# Patient Record
Sex: MALE | Race: WHITE | NOT HISPANIC OR LATINO | Employment: STUDENT | URBAN - METROPOLITAN AREA
[De-identification: names, ages, dates, MRNs, and addresses within clinical notes are randomized per-mention and may not be internally consistent; named-entity substitution may affect disease eponyms.]

---

## 2023-06-26 ENCOUNTER — LAB (OUTPATIENT)
Dept: LAB | Facility: LAB | Age: 23
End: 2023-06-26
Payer: COMMERCIAL

## 2023-06-26 ENCOUNTER — OFFICE VISIT (OUTPATIENT)
Dept: PRIMARY CARE | Facility: CLINIC | Age: 23
End: 2023-06-26
Payer: COMMERCIAL

## 2023-06-26 VITALS
HEART RATE: 97 BPM | TEMPERATURE: 98.1 F | DIASTOLIC BLOOD PRESSURE: 83 MMHG | BODY MASS INDEX: 26.77 KG/M2 | SYSTOLIC BLOOD PRESSURE: 105 MMHG | WEIGHT: 191.2 LBS | HEIGHT: 71 IN

## 2023-06-26 DIAGNOSIS — Z00.00 HEALTH CARE MAINTENANCE: Primary | ICD-10-CM

## 2023-06-26 DIAGNOSIS — Z00.00 HEALTH CARE MAINTENANCE: ICD-10-CM

## 2023-06-26 PROBLEM — J30.1 ALLERGIC RHINITIS DUE TO POLLEN: Status: ACTIVE | Noted: 2022-03-20

## 2023-06-26 PROBLEM — J38.3 VOCAL CORD DYSFUNCTION: Status: RESOLVED | Noted: 2023-06-26 | Resolved: 2023-06-26

## 2023-06-26 PROBLEM — J45.909 ASTHMA (HHS-HCC): Status: ACTIVE | Noted: 2023-06-26

## 2023-06-26 PROBLEM — T78.05XA ANAPHYLAXIS DUE TO TREE NUT: Status: ACTIVE | Noted: 2022-03-20

## 2023-06-26 PROBLEM — B00.9 HSV-1 (HERPES SIMPLEX VIRUS 1) INFECTION: Status: ACTIVE | Noted: 2023-06-26

## 2023-06-26 LAB
ALANINE AMINOTRANSFERASE (SGPT) (U/L) IN SER/PLAS: 20 U/L (ref 10–52)
ALBUMIN (G/DL) IN SER/PLAS: 5 G/DL (ref 3.4–5)
ALKALINE PHOSPHATASE (U/L) IN SER/PLAS: 47 U/L (ref 33–120)
ANION GAP IN SER/PLAS: 11 MMOL/L (ref 10–20)
ASPARTATE AMINOTRANSFERASE (SGOT) (U/L) IN SER/PLAS: 24 U/L (ref 9–39)
BILIRUBIN TOTAL (MG/DL) IN SER/PLAS: 0.6 MG/DL (ref 0–1.2)
CALCIUM (MG/DL) IN SER/PLAS: 9.7 MG/DL (ref 8.6–10.3)
CARBON DIOXIDE, TOTAL (MMOL/L) IN SER/PLAS: 26 MMOL/L (ref 21–32)
CHLORIDE (MMOL/L) IN SER/PLAS: 106 MMOL/L (ref 98–107)
CREATININE (MG/DL) IN SER/PLAS: 1.14 MG/DL (ref 0.5–1.3)
ERYTHROCYTE DISTRIBUTION WIDTH (RATIO) BY AUTOMATED COUNT: 11.8 % (ref 11.5–14.5)
ERYTHROCYTE MEAN CORPUSCULAR HEMOGLOBIN CONCENTRATION (G/DL) BY AUTOMATED: 34.2 G/DL (ref 32–36)
ERYTHROCYTE MEAN CORPUSCULAR VOLUME (FL) BY AUTOMATED COUNT: 88 FL (ref 80–100)
ERYTHROCYTES (10*6/UL) IN BLOOD BY AUTOMATED COUNT: 4.8 X10E12/L (ref 4.5–5.9)
GFR MALE: >90 ML/MIN/1.73M2
GLUCOSE (MG/DL) IN SER/PLAS: 86 MG/DL (ref 74–99)
HEMATOCRIT (%) IN BLOOD BY AUTOMATED COUNT: 42.4 % (ref 41–52)
HEMOGLOBIN (G/DL) IN BLOOD: 14.5 G/DL (ref 13.5–17.5)
LEUKOCYTES (10*3/UL) IN BLOOD BY AUTOMATED COUNT: 9 X10E9/L (ref 4.4–11.3)
PLATELETS (10*3/UL) IN BLOOD AUTOMATED COUNT: 296 X10E9/L (ref 150–450)
POTASSIUM (MMOL/L) IN SER/PLAS: 4.2 MMOL/L (ref 3.5–5.3)
PROTEIN TOTAL: 7.4 G/DL (ref 6.4–8.2)
SODIUM (MMOL/L) IN SER/PLAS: 139 MMOL/L (ref 136–145)
UREA NITROGEN (MG/DL) IN SER/PLAS: 15 MG/DL (ref 6–23)

## 2023-06-26 PROCEDURE — 1036F TOBACCO NON-USER: CPT | Performed by: NURSE PRACTITIONER

## 2023-06-26 PROCEDURE — 99395 PREV VISIT EST AGE 18-39: CPT | Performed by: NURSE PRACTITIONER

## 2023-06-26 PROCEDURE — 36415 COLL VENOUS BLD VENIPUNCTURE: CPT

## 2023-06-26 PROCEDURE — 80053 COMPREHEN METABOLIC PANEL: CPT

## 2023-06-26 PROCEDURE — 85027 COMPLETE CBC AUTOMATED: CPT

## 2023-06-26 RX ORDER — EPINEPHRINE 0.15 MG/.3ML
0.15 INJECTION INTRAMUSCULAR
COMMUNITY
End: 2023-06-26 | Stop reason: ALTCHOICE

## 2023-06-26 RX ORDER — LEVALBUTEROL TARTRATE 45 UG/1
AEROSOL, METERED ORAL
COMMUNITY
Start: 2020-11-02

## 2023-06-26 RX ORDER — BUDESONIDE AND FORMOTEROL FUMARATE DIHYDRATE 80; 4.5 UG/1; UG/1
2 AEROSOL RESPIRATORY (INHALATION) AS NEEDED
COMMUNITY

## 2023-06-26 RX ORDER — VALACYCLOVIR HYDROCHLORIDE 1 G/1
1000 TABLET, FILM COATED ORAL 2 TIMES DAILY
Qty: 14 TABLET | Refills: 0 | COMMUNITY
Start: 2023-04-13 | End: 2023-04-20

## 2023-06-26 RX ORDER — FLUTICASONE PROPIONATE 50 MCG
SPRAY, SUSPENSION (ML) NASAL AS NEEDED
COMMUNITY
Start: 2022-06-29

## 2023-06-26 RX ORDER — ALBUTEROL SULFATE 90 UG/1
AEROSOL, METERED RESPIRATORY (INHALATION) AS NEEDED
COMMUNITY
Start: 2022-06-29

## 2023-06-26 RX ORDER — CETIRIZINE HYDROCHLORIDE 5 MG/5ML
SOLUTION ORAL AS NEEDED
COMMUNITY
End: 2023-06-26 | Stop reason: ALTCHOICE

## 2023-06-26 RX ORDER — VALACYCLOVIR HYDROCHLORIDE 500 MG/1
1 TABLET, FILM COATED ORAL DAILY
COMMUNITY
Start: 2023-04-13

## 2023-06-26 RX ORDER — MINERAL OIL
1 ENEMA (ML) RECTAL DAILY
COMMUNITY
Start: 2022-06-29

## 2023-06-26 RX ORDER — EPINEPHRINE 0.3 MG/.3ML
INJECTION SUBCUTANEOUS
COMMUNITY

## 2023-06-26 ASSESSMENT — ENCOUNTER SYMPTOMS
SORE THROAT: 0
CHEST TIGHTNESS: 0
EYE DISCHARGE: 0
ADENOPATHY: 0
DYSPHORIC MOOD: 0
EYE ITCHING: 0
SPEECH DIFFICULTY: 0
HEMATURIA: 0
BACK PAIN: 0
MYALGIAS: 0
UNEXPECTED WEIGHT CHANGE: 0
WEAKNESS: 0
PHOTOPHOBIA: 0
NAUSEA: 0
SLEEP DISTURBANCE: 0
COUGH: 0
DIFFICULTY URINATING: 0
PALPITATIONS: 1
ARTHRALGIAS: 0
FEVER: 0
SHORTNESS OF BREATH: 0
POLYDIPSIA: 0
EYE REDNESS: 0
POLYPHAGIA: 0
FATIGUE: 0
DIARRHEA: 1
NUMBNESS: 0
BLOOD IN STOOL: 0
BRUISES/BLEEDS EASILY: 0
RHINORRHEA: 0
DYSURIA: 0
HEADACHES: 0
NECK PAIN: 0
FREQUENCY: 0
VOMITING: 0
SINUS PRESSURE: 0
EYE PAIN: 0
ABDOMINAL PAIN: 0
CONSTIPATION: 0
CHILLS: 0
WHEEZING: 0
NERVOUS/ANXIOUS: 0
DIZZINESS: 0

## 2023-06-26 NOTE — PROGRESS NOTES
Subjective   Patient ID: Abhijit Woodard is a 22 y.o. male who presents for Annual Exam.    HPI  Last well exam: 2.5 years ago  Overall health has been good.  There have been no changes in the patients PMH, PSH, FH or social history. Reviewed today.  Diet: Overall healthy  Exercise: Weight lift and cardio 5 days a week  Dental: Regular dental exams. Brushes 2 times daily. Flosses 2 times/wk  Vision: Last Eye exam: no recent eye exam    Uncorrected Vision  Tobacco Use: No tobacco  Alcohol Use: 6-10 drinks a week (mainly on the weekends at school).  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  Sexually active: Uses condoms most of the time. 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   Has had lesions on penis that are red and not painful- last 4 days- has not been able to determine if HSV.   Has never had viral culture done.   None present today.    Review of Systems   Constitutional:  Negative for chills, fatigue, fever and unexpected weight change.   HENT:  Positive for congestion. Negative for ear pain, hearing loss, nosebleeds, postnasal drip, rhinorrhea, sinus pressure, sore throat and tinnitus.    Eyes:  Negative for photophobia, pain, discharge, redness, itching and visual disturbance.   Respiratory:  Negative for cough, chest tightness, shortness of breath and wheezing.    Cardiovascular:  Positive for palpitations. Negative for chest pain and leg swelling.   Gastrointestinal:  Positive for diarrhea. Negative for abdominal pain, blood in stool, constipation, nausea and vomiting.   Endocrine: Negative for cold intolerance, heat intolerance, polydipsia, polyphagia and polyuria.   Genitourinary:  Negative for difficulty urinating, dysuria, frequency, hematuria and urgency.   Musculoskeletal:  Negative for arthralgias, back pain, myalgias and  "neck pain.   Skin:  Negative for rash.   Neurological:  Negative for dizziness, syncope, speech difficulty, weakness, numbness and headaches.   Hematological:  Negative for adenopathy. Does not bruise/bleed easily.   Psychiatric/Behavioral:  Negative for dysphoric mood and sleep disturbance. The patient is not nervous/anxious.        Objective   /83   Pulse 97   Temp 36.7 °C (98.1 °F)   Ht 1.803 m (5' 11\")   Wt 86.7 kg (191 lb 3.2 oz)   BMI 26.67 kg/m²     Physical Exam  Constitutional:       General: He is not in acute distress.     Appearance: Normal appearance. He is not toxic-appearing.   HENT:      Head: Normocephalic and atraumatic.      Right Ear: Tympanic membrane and ear canal normal.      Left Ear: Tympanic membrane and ear canal normal.      Nose: Nose normal.      Mouth/Throat:      Mouth: Mucous membranes are moist.      Pharynx: Oropharynx is clear.   Eyes:      Extraocular Movements: Extraocular movements intact.      Conjunctiva/sclera: Conjunctivae normal.      Pupils: Pupils are equal, round, and reactive to light.   Neck:      Thyroid: No thyroid mass or thyromegaly.      Vascular: No carotid bruit.   Cardiovascular:      Rate and Rhythm: Normal rate and regular rhythm.      Pulses: Normal pulses.      Heart sounds: Normal heart sounds, S1 normal and S2 normal. No murmur heard.  Pulmonary:      Effort: Pulmonary effort is normal. No respiratory distress.      Breath sounds: Normal breath sounds.   Abdominal:      General: Abdomen is flat. Bowel sounds are normal.      Palpations: Abdomen is soft.      Tenderness: There is no abdominal tenderness.   Musculoskeletal:         General: Normal range of motion.      Cervical back: Normal range of motion and neck supple.      Right lower leg: No edema.      Left lower leg: No edema.   Lymphadenopathy:      Cervical: No cervical adenopathy.   Skin:     General: Skin is warm and dry.   Neurological:      Mental Status: He is alert and oriented " to person, place, and time.      Cranial Nerves: No cranial nerve deficit.      Sensory: No sensory deficit.      Motor: No weakness.      Coordination: Coordination normal.      Gait: Gait normal.      Deep Tendon Reflexes: Reflexes are normal and symmetric. Reflexes normal.   Psychiatric:         Attention and Perception: Attention normal.         Mood and Affect: Mood and affect normal.         Speech: Speech normal.         Behavior: Behavior normal.         Thought Content: Thought content normal.         Judgment: Judgment normal.         Assessment/Plan   Problem List Items Addressed This Visit          Health Encounters    Health care maintenance - Primary     UTD on recommended screening and vaccines.  Check labs today.  Advised to follow up when has genital lesion for viral culture.         Relevant Orders    Comprehensive Metabolic Panel (Completed)    CBC (Completed)            It has been a pleasure seeing you today!

## 2023-06-27 PROBLEM — Z00.00 HEALTH CARE MAINTENANCE: Status: ACTIVE | Noted: 2023-06-27

## 2023-06-28 NOTE — ASSESSMENT & PLAN NOTE
UTD on recommended screening and vaccines.  Check labs today.  Advised to follow up when has genital lesion for viral culture.

## 2023-08-04 ENCOUNTER — APPOINTMENT (OUTPATIENT)
Dept: PRIMARY CARE | Facility: CLINIC | Age: 23
End: 2023-08-04
Payer: COMMERCIAL

## 2023-08-04 ENCOUNTER — OFFICE VISIT (OUTPATIENT)
Dept: PRIMARY CARE | Facility: CLINIC | Age: 23
End: 2023-08-04
Payer: COMMERCIAL

## 2023-08-04 VITALS
WEIGHT: 185.2 LBS | BODY MASS INDEX: 25.83 KG/M2 | TEMPERATURE: 97.2 F | DIASTOLIC BLOOD PRESSURE: 86 MMHG | SYSTOLIC BLOOD PRESSURE: 120 MMHG

## 2023-08-04 DIAGNOSIS — N48.9 LESION OF PENIS: Primary | ICD-10-CM

## 2023-08-04 PROBLEM — D80.9 IMMUNODEFICIENCY WITH PREDOMINANTLY ANTIBODY DEFECTS, UNSPECIFIED (MULTI): Status: RESOLVED | Noted: 2023-08-04 | Resolved: 2023-08-04

## 2023-08-04 PROCEDURE — 87529 HSV DNA AMP PROBE: CPT

## 2023-08-04 PROCEDURE — 99213 OFFICE O/P EST LOW 20 MIN: CPT | Performed by: NURSE PRACTITIONER

## 2023-08-04 PROCEDURE — 1036F TOBACCO NON-USER: CPT | Performed by: NURSE PRACTITIONER

## 2023-08-04 ASSESSMENT — ENCOUNTER SYMPTOMS
CHILLS: 0
FEVER: 0
ROS SKIN COMMENTS: AS NOTED IN THE HPI.
FATIGUE: 0

## 2023-08-04 ASSESSMENT — PATIENT HEALTH QUESTIONNAIRE - PHQ9
SUM OF ALL RESPONSES TO PHQ9 QUESTIONS 1 AND 2: 1
2. FEELING DOWN, DEPRESSED OR HOPELESS: NOT AT ALL
1. LITTLE INTEREST OR PLEASURE IN DOING THINGS: SEVERAL DAYS

## 2023-08-04 NOTE — PROGRESS NOTES
Subjective   Patient ID: Abhijit Woodard is a 22 y.o. male who presents for Establish Care (Per pt would like to speak with Yoly ).    HPI  He presents to the office today for evaluation of lesions on his penis which he noticed yesterday.  The areas are scattered. They are red and very minimally raised.  No pain to the lesions.  No itching.  No drainage.  Has similar lesion in spring 2023.  Was diagnosed with HSV at that time but they were not able to obtain a culture.  No fever or chills.   Feeling ok in general. Felt slightly achy earlier in the week.  No new sexual partners. Has been with the same partner for 5 months.     Review of Systems   Constitutional:  Negative for chills, fatigue and fever.   Skin:         As noted in the HPI.     Objective   /86 (BP Location: Left arm, Patient Position: Sitting, BP Cuff Size: Adult)   Temp 36.2 °C (97.2 °F) (Temporal)   Wt 84 kg (185 lb 3.2 oz)   BMI 25.83 kg/m²     Physical Exam  Constitutional:       General: He is not in acute distress.     Appearance: Normal appearance. He is not toxic-appearing.   Cardiovascular:      Rate and Rhythm: Normal rate and regular rhythm.      Heart sounds: Normal heart sounds, S1 normal and S2 normal.   Pulmonary:      Effort: Pulmonary effort is normal.      Breath sounds: Normal breath sounds and air entry.   Genitourinary:     Penis: Lesions present.       Comments: (+) small slightly raised erythematous areas noted to the penis   No visible vesicles noted today.  The lesions were swabbed today.  Lymphadenopathy:      Cervical: No cervical adenopathy.      Lower Body: No right inguinal adenopathy. No left inguinal adenopathy.   Neurological:      Mental Status: He is alert and oriented to person, place, and time.   Psychiatric:         Mood and Affect: Mood normal.         Behavior: Behavior normal.         Thought Content: Thought content normal.         Judgment: Judgment normal.     Assessment/Plan   Problem List  Items Addressed This Visit       Lesion of penis - Primary     Suspect herpetic but no vesicles noted yet- viral culture obtained today.         Relevant Orders    HSV PCR            It has been a pleasure seeing you today!

## 2023-08-05 LAB
HSV 1 PCR QUAL, SKIN/MUCOSA: NOT DETECTED
HSV 2 PCR QUAL, SKIN/MUCOSA: NOT DETECTED

## 2024-05-31 ENCOUNTER — OFFICE VISIT (OUTPATIENT)
Dept: PRIMARY CARE | Facility: CLINIC | Age: 24
End: 2024-05-31
Payer: COMMERCIAL

## 2024-05-31 VITALS
TEMPERATURE: 97.5 F | BODY MASS INDEX: 26.95 KG/M2 | HEART RATE: 78 BPM | SYSTOLIC BLOOD PRESSURE: 123 MMHG | WEIGHT: 193.2 LBS | OXYGEN SATURATION: 95 % | DIASTOLIC BLOOD PRESSURE: 83 MMHG

## 2024-05-31 DIAGNOSIS — M54.6 ACUTE MIDLINE THORACIC BACK PAIN: Primary | ICD-10-CM

## 2024-05-31 DIAGNOSIS — M62.838 MUSCLE SPASM: ICD-10-CM

## 2024-05-31 PROCEDURE — 99213 OFFICE O/P EST LOW 20 MIN: CPT | Performed by: NURSE PRACTITIONER

## 2024-05-31 RX ORDER — NAPROXEN 500 MG/1
TABLET ORAL
COMMUNITY
Start: 2024-05-21

## 2024-05-31 ASSESSMENT — ENCOUNTER SYMPTOMS
NUMBNESS: 0
CHILLS: 0
BACK PAIN: 1
WEAKNESS: 0
FATIGUE: 0
OCCASIONAL FEELINGS OF UNSTEADINESS: 0
COUGH: 0
DEPRESSION: 0
SHORTNESS OF BREATH: 0
LOSS OF SENSATION IN FEET: 0
PALPITATIONS: 0
FEVER: 0

## 2024-05-31 ASSESSMENT — PATIENT HEALTH QUESTIONNAIRE - PHQ9
SUM OF ALL RESPONSES TO PHQ9 QUESTIONS 1 & 2: 0
2. FEELING DOWN, DEPRESSED OR HOPELESS: NOT AT ALL
1. LITTLE INTEREST OR PLEASURE IN DOING THINGS: NOT AT ALL

## 2024-05-31 NOTE — PATIENT INSTRUCTIONS
Continue to take NSAID with food and muscle relaxer as needed.  Follow up if pain does not continue to improve or should become worse.

## 2024-05-31 NOTE — PROGRESS NOTES
Subjective    Abhijit Woodard is a 23 y.o. male who presents for Hospital Follow-up (Hospital discharge for back pain/spasms/).    HPI  He presents to the office today for an ER follow up. On May 17th he had an annoying pain in his back between the shoulder blades.   (+) pulsatile and would go through his chest.   Would get really intense and then settle down but not go away.  Lasted 6 hours and then went away on its own.    Then on 5/21/2024 he was watching [email or web address] game at friend house.   Then was playing darts and the pain came on.  Reports he had a hard time driving home the pain was so severe.  Would try to stretch but then became stiff and locked up.   Could not get up the steps when he got home.   Laid on his stomach sprawled out.   Was screaming in pain -mom took hm to the ER for evaluation.   In the ER had x-rays (back and chest) along with blood work and was determined to be a muscle spasm- given muscle relaxer and Naproxen.  Mild pain at times but never as severe again.  He is mainly feeling stiff.     Unable to view x-ray from ER visit today. Patient and mom state testing was normal.    The pain was never associated with eating.   No abdominal pain, nausea or vomiting.   No SOB but hard to take a deep breath due to pain.  No palpitations.  No fever or chills.  No recent cough.  The week before the pain started he was moving home from college and lifting things  but nothing real heavy.    Follow with Dr. Oconnor for his asthma.    Review of Systems   Constitutional:  Negative for chills, fatigue and fever.   Respiratory:  Negative for cough and shortness of breath.    Cardiovascular:  Negative for chest pain, palpitations and leg swelling.   Musculoskeletal:  Positive for back pain.   Neurological:  Negative for weakness and numbness.       Objective   /83 (BP Location: Left arm, Patient Position: Sitting, BP Cuff Size: Adult)   Pulse 78   Temp 36.4 °C (97.5 °F) (Temporal)   Wt 87.6 kg (193 lb  3.2 oz)   SpO2 95%   BMI 26.95 kg/m²     Physical Exam  Constitutional:       General: He is not in acute distress.     Appearance: Normal appearance. He is not toxic-appearing.   Eyes:      Extraocular Movements: Extraocular movements intact.      Conjunctiva/sclera: Conjunctivae normal.      Pupils: Pupils are equal, round, and reactive to light.   Cardiovascular:      Rate and Rhythm: Normal rate and regular rhythm.      Pulses: Normal pulses.      Heart sounds: Normal heart sounds, S1 normal and S2 normal. No murmur heard.  Pulmonary:      Effort: Pulmonary effort is normal. No respiratory distress.      Breath sounds: Normal breath sounds and air entry.   Abdominal:      General: Bowel sounds are normal.      Palpations: Abdomen is soft.      Tenderness: There is no abdominal tenderness.   Musculoskeletal:      Thoracic back: Normal. No spasms or bony tenderness. Normal range of motion.      Right lower leg: No edema.      Left lower leg: No edema.      Comments: (+) slight tenderness noted near scapula (R>L)  Strength 5/5 throughout   Lymphadenopathy:      Cervical: No cervical adenopathy.   Neurological:      Mental Status: He is alert and oriented to person, place, and time.      Sensory: Sensation is intact.      Motor: Motor function is intact.   Psychiatric:         Attention and Perception: Attention normal.         Mood and Affect: Mood and affect normal.         Behavior: Behavior normal. Behavior is cooperative.         Thought Content: Thought content normal.         Cognition and Memory: Cognition normal.         Judgment: Judgment normal.         Assessment/Plan   Problem List Items Addressed This Visit    None  Visit Diagnoses       Acute midline thoracic back pain    -  Primary    Relevant Orders    MR thoracic spine wo IV contrast    Muscle spasm            Since symptoms are improving and he mainly has stiffness in thoracic back, discussed giving the back a little more time/couple more weeks  to see if symptoms resolve. His mom (on the phone during the visit) is concerned given how severe his pain (like nothing she has ever seen with him)  the night she took him to the ER and requested an MRI be obtained rather than waiting.    It has been a pleasure seeing you today!

## 2024-06-19 ENCOUNTER — TELEPHONE (OUTPATIENT)
Dept: PRIMARY CARE | Facility: CLINIC | Age: 24
End: 2024-06-19
Payer: COMMERCIAL

## 2024-06-19 NOTE — TELEPHONE ENCOUNTER
----- Message from ANANDA Alves sent at 2024  3:40 PM EDT -----  Regarding: FW: DENIAL OF CPT 93414  Please let him know his insurance denied his MRI of the thoracic spine as they require 6 weeks of treatment without improvement of symptoms for the test to be completed.  Is he still having pain?  ----- Message -----  From: Liana Stahl  Sent: 2024   1:09 PM EDT  To: ANANDA Alves  Subject: DENIAL OF CPT 15535                              Dear Dr. Mcgregor ,     Abhijit Woodard  2000 MRN [id number] :  has an insurance plan that requires pre-certification and this service has been denied.     Please note this patient is scheduled for MR THORACIC SPINE WO IV CONTRAST    I have provided the patients symptoms and clinical rationale, however, they required that you call to further provide evidence of the medical necessity.     Please contact a peer reviewer at [phone] Please provide the patients Case#  [id number]  when making this call.     Your doctor told us that you have pain in the middle of your back. Your doctor ordered an MRI of the middle of your back. An MRI is a way to take pictures of the inside of your body. This test should be used when the pain has not improved after six weeks of treatment by your doctor. Treatment should include medications and other forms of therapy. These need to include home exercises or physical therapy. We reviewed the notes we have. The notes do not show that you had at least six weeks of such treatment. Based on the information we have, this test is not medically necessary. We used [facility] Medical Benefits Management Clinical Guideline titled Imaging of the Spine to make this decision. You may view this guideline at [email or web address]-[email or web address].    Please reply  to this in basket message  with the outcome of your review.     Thank you for assisting with the care of this patients account management

## 2024-06-19 NOTE — TELEPHONE ENCOUNTER
Called pt to inform of denial. He refused to do PT and will call insurance. He stated he has a little pain but not as bad.

## 2024-06-21 ENCOUNTER — APPOINTMENT (OUTPATIENT)
Dept: RADIOLOGY | Facility: CLINIC | Age: 24
End: 2024-06-21
Payer: COMMERCIAL

## 2024-07-17 ENCOUNTER — TELEPHONE (OUTPATIENT)
Dept: PRIMARY CARE | Facility: CLINIC | Age: 24
End: 2024-07-17
Payer: COMMERCIAL

## 2024-07-17 NOTE — TELEPHONE ENCOUNTER
Pt was ok to be double booked for Friday 7/19 at 1130am. - they were also offered to schedule with Sandra bond tomorrow. They chose to wait to see BB on friday

## 2024-07-17 NOTE — TELEPHONE ENCOUNTER
Spoke with mom. Pt has asthma and has a bad cough. She wants him to be seen ASAP. I do not see any available appointments soon. The mom is very upset. Please advise

## 2024-07-19 ENCOUNTER — OFFICE VISIT (OUTPATIENT)
Dept: PRIMARY CARE | Facility: CLINIC | Age: 24
End: 2024-07-19
Payer: COMMERCIAL

## 2024-07-19 VITALS
HEART RATE: 94 BPM | TEMPERATURE: 98.3 F | OXYGEN SATURATION: 97 % | DIASTOLIC BLOOD PRESSURE: 82 MMHG | SYSTOLIC BLOOD PRESSURE: 100 MMHG | HEIGHT: 70 IN | BODY MASS INDEX: 26.88 KG/M2 | WEIGHT: 187.8 LBS

## 2024-07-19 DIAGNOSIS — J01.90 ACUTE SINUSITIS, RECURRENCE NOT SPECIFIED, UNSPECIFIED LOCATION: Primary | ICD-10-CM

## 2024-07-19 PROCEDURE — 1036F TOBACCO NON-USER: CPT | Performed by: NURSE PRACTITIONER

## 2024-07-19 PROCEDURE — 99213 OFFICE O/P EST LOW 20 MIN: CPT | Performed by: NURSE PRACTITIONER

## 2024-07-19 PROCEDURE — 3008F BODY MASS INDEX DOCD: CPT | Performed by: NURSE PRACTITIONER

## 2024-07-19 RX ORDER — AMOXICILLIN AND CLAVULANATE POTASSIUM 875; 125 MG/1; MG/1
875 TABLET, FILM COATED ORAL 2 TIMES DAILY
Qty: 14 TABLET | Refills: 0 | Status: SHIPPED | OUTPATIENT
Start: 2024-07-19

## 2024-07-19 RX ORDER — GLUCOSAMINE SULFATE 500 MG
1 TABLET ORAL DAILY
COMMUNITY

## 2024-07-19 ASSESSMENT — PATIENT HEALTH QUESTIONNAIRE - PHQ9
SUM OF ALL RESPONSES TO PHQ9 QUESTIONS 1 AND 2: 0
1. LITTLE INTEREST OR PLEASURE IN DOING THINGS: NOT AT ALL
2. FEELING DOWN, DEPRESSED OR HOPELESS: NOT AT ALL

## 2024-07-19 ASSESSMENT — ENCOUNTER SYMPTOMS
SORE THROAT: 0
FEVER: 0
DIARRHEA: 0
COUGH: 1
FATIGUE: 1
VOMITING: 0
RHINORRHEA: 1
WHEEZING: 0
NAUSEA: 0
SHORTNESS OF BREATH: 0
MYALGIAS: 1
SINUS PRESSURE: 1
SINUS PAIN: 0
CHILLS: 0
ABDOMINAL PAIN: 0

## 2024-07-19 NOTE — PROGRESS NOTES
"Subjective    Abhijit Woodard is a 23 y.o. male who presents for URI (Head congestion with drainage and cough for 6 days. Pt also said his back is aching. Pt reported took Covid test 2 days ago (7/17/24) and today (07/19/24) both test negative. ).    HPI  He presents to the office today for evaluation of cough and nasal congestion.   Started 3 weeks ago with a cough. The cough was productive at times.  [place] he also had some PND.  No other symptoms.   No SOB or wheezing.  Using Symbicort everyday but no albuterol needed.     Then he reports he started with nasal congestion on Wednesday 7/10/2023.   No ear pain or sore throat.  No abdoinal pain, nausea, vomiting or diarrhea.  Then last Friday did not feel well at all into the weekend  (+) body aches but no fever.  (+) nasal congestion/head pressure  Coughing and sneezing.  No SOB or wheezing.  Could not go to work Tuesday or Wednesday.  Yesterday started feeling a little bit better.  Has been taking Mucinex D. Allegra, Flonase and Symbicort.      Review of Systems   Constitutional:  Positive for fatigue. Negative for chills and fever.   HENT:  Positive for congestion, postnasal drip, rhinorrhea, sinus pressure and sneezing. Negative for ear pain, sinus pain and sore throat.    Respiratory:  Positive for cough. Negative for shortness of breath and wheezing.    Gastrointestinal:  Negative for abdominal pain, diarrhea, nausea and vomiting.   Musculoskeletal:  Positive for myalgias.     Objective   /82 (BP Location: Left arm, Patient Position: Sitting)   Pulse 94   Temp 36.8 °C (98.3 °F) (Oral)   Ht 1.779 m (5' 10.04\")   Wt 85.2 kg (187 lb 12.8 oz)   SpO2 97%   BMI 26.92 kg/m²     Physical Exam  Constitutional:       General: He is not in acute distress.     Appearance: Normal appearance. He is not toxic-appearing.   HENT:      Right Ear: Hearing, tympanic membrane, ear canal and external ear normal.      Left Ear: Hearing, tympanic membrane, ear canal and " external ear normal.      Nose:      Right Sinus: Maxillary sinus tenderness present. No frontal sinus tenderness.      Left Sinus: Maxillary sinus tenderness present. No frontal sinus tenderness.      Mouth/Throat:      Mouth: Mucous membranes are moist.      Pharynx: No pharyngeal swelling or posterior oropharyngeal erythema.   Cardiovascular:      Rate and Rhythm: Normal rate and regular rhythm.      Heart sounds: Normal heart sounds, S1 normal and S2 normal. No murmur heard.  Pulmonary:      Effort: Pulmonary effort is normal.      Breath sounds: Normal breath sounds and air entry. No wheezing.   Lymphadenopathy:      Cervical: No cervical adenopathy.   Neurological:      Mental Status: He is alert and oriented to person, place, and time.   Psychiatric:         Mood and Affect: Mood normal.         Behavior: Behavior normal.         Thought Content: Thought content normal.         Judgment: Judgment normal.         Assessment/Plan   Problem List Items Addressed This Visit    None  Visit Diagnoses       Acute sinusitis, recurrence not specified, unspecified location    -  Primary    Relevant Medications    amoxicillin-pot clavulanate (Augmentin) 875-125 mg tablet        Augmentin as directed with food if symptoms do not continue to improve over the weekend.  7 days sent in- may stop after 5 days as long as symptoms are better.  He is to let me know if symptoms persist.    It has been a pleasure seeing you today!

## 2024-07-19 NOTE — PATIENT INSTRUCTIONS
Augmentin as directed with food if symptoms do not continue to improve over the weekend.  7 days sent in- may stop after 5 days as long as symptoms are better.  Let me know if symptoms persist.

## 2024-09-26 DIAGNOSIS — B00.9 HSV-1 (HERPES SIMPLEX VIRUS 1) INFECTION: Primary | ICD-10-CM

## 2024-09-26 DIAGNOSIS — B00.9 HSV-1 (HERPES SIMPLEX VIRUS 1) INFECTION: ICD-10-CM

## 2024-09-26 NOTE — TELEPHONE ENCOUNTER
No symptoms. He also said he is at school to send it now to [facility] pharmacy

## 2024-09-27 RX ORDER — VALACYCLOVIR HYDROCHLORIDE 500 MG/1
500 TABLET, FILM COATED ORAL DAILY
Qty: 90 TABLET | Refills: 1 | Status: SHIPPED | OUTPATIENT
Start: 2024-09-27 | End: 2024-09-27 | Stop reason: SDUPTHER

## 2024-09-27 RX ORDER — VALACYCLOVIR HYDROCHLORIDE 500 MG/1
500 TABLET, FILM COATED ORAL DAILY
Qty: 90 TABLET | Refills: 1 | Status: SHIPPED | OUTPATIENT
Start: 2024-09-27

## 2024-12-20 DIAGNOSIS — B00.9 HSV-1 (HERPES SIMPLEX VIRUS 1) INFECTION: ICD-10-CM

## 2024-12-20 RX ORDER — VALACYCLOVIR HYDROCHLORIDE 500 MG/1
500 TABLET, FILM COATED ORAL DAILY
Qty: 90 TABLET | Refills: 0 | Status: SHIPPED | OUTPATIENT
Start: 2024-12-20

## 2024-12-20 NOTE — TELEPHONE ENCOUNTER
Per HIPAA ok to leave detailed message, LM on pt voicemail with this information. He should call back if he has any questions or concerns.

## 2024-12-20 NOTE — TELEPHONE ENCOUNTER
Pt is calling in for med refill on  -this message was saved on the voicemail.    Valtrex  -has an onset cold sore he said.    LOV: 07/19/24  NOV: none      [facility]

## 2025-08-05 ENCOUNTER — APPOINTMENT (OUTPATIENT)
Dept: PRIMARY CARE | Facility: CLINIC | Age: 25
End: 2025-08-05
Payer: COMMERCIAL

## 2025-08-05 VITALS
OXYGEN SATURATION: 97 % | WEIGHT: 185.6 LBS | DIASTOLIC BLOOD PRESSURE: 78 MMHG | BODY MASS INDEX: 26.57 KG/M2 | HEIGHT: 70 IN | TEMPERATURE: 97.6 F | HEART RATE: 88 BPM | SYSTOLIC BLOOD PRESSURE: 98 MMHG

## 2025-08-05 DIAGNOSIS — Z00.00 HEALTH CARE MAINTENANCE: Primary | ICD-10-CM

## 2025-08-05 DIAGNOSIS — R10.33 UMBILICAL PAIN: ICD-10-CM

## 2025-08-05 DIAGNOSIS — Z13.220 SCREENING FOR LIPID DISORDERS: ICD-10-CM

## 2025-08-05 DIAGNOSIS — Z23 ENCOUNTER TO VACCINATE PATIENT: ICD-10-CM

## 2025-08-05 PROCEDURE — 1036F TOBACCO NON-USER: CPT | Performed by: NURSE PRACTITIONER

## 2025-08-05 PROCEDURE — 99395 PREV VISIT EST AGE 18-39: CPT | Performed by: NURSE PRACTITIONER

## 2025-08-05 PROCEDURE — 3008F BODY MASS INDEX DOCD: CPT | Performed by: NURSE PRACTITIONER

## 2025-08-05 PROCEDURE — 90715 TDAP VACCINE 7 YRS/> IM: CPT | Performed by: NURSE PRACTITIONER

## 2025-08-05 PROCEDURE — 90471 IMMUNIZATION ADMIN: CPT | Performed by: NURSE PRACTITIONER

## 2025-08-05 ASSESSMENT — ENCOUNTER SYMPTOMS
ARTHRALGIAS: 0
DYSPHORIC MOOD: 0
POLYPHAGIA: 0
EYE DISCHARGE: 0
HEADACHES: 0
POLYDIPSIA: 0
SLEEP DISTURBANCE: 0
EYE PAIN: 0
HEMATURIA: 0
FATIGUE: 0
NECK PAIN: 0
COUGH: 0
RHINORRHEA: 0
DYSURIA: 0
DIZZINESS: 0
DIARRHEA: 0
CHEST TIGHTNESS: 0
MYALGIAS: 0
UNEXPECTED WEIGHT CHANGE: 0
FREQUENCY: 0
SORE THROAT: 0
ADENOPATHY: 0
EYE ITCHING: 0
BLOOD IN STOOL: 0
WEAKNESS: 0
PALPITATIONS: 0
SPEECH DIFFICULTY: 0
NAUSEA: 0
PHOTOPHOBIA: 0
NERVOUS/ANXIOUS: 0
NUMBNESS: 0
CONSTIPATION: 0
SINUS PRESSURE: 0
VOMITING: 0
FEVER: 0
EYE REDNESS: 0
CHILLS: 0
ABDOMINAL PAIN: 1
DIFFICULTY URINATING: 0
SHORTNESS OF BREATH: 0
BRUISES/BLEEDS EASILY: 0
BACK PAIN: 0
WHEEZING: 0

## 2025-08-05 ASSESSMENT — PATIENT HEALTH QUESTIONNAIRE - PHQ9
2. FEELING DOWN, DEPRESSED OR HOPELESS: NOT AT ALL
1. LITTLE INTEREST OR PLEASURE IN DOING THINGS: NOT AT ALL
SUM OF ALL RESPONSES TO PHQ9 QUESTIONS 1 AND 2: 0

## 2025-08-05 NOTE — PATIENT INSTRUCTIONS
Continue to focus on a a healthy diet and exercise   Check fasting lipid panel.   You were updated on a tdap today.

## 2025-08-05 NOTE — PROGRESS NOTES
Subjective   Abhijit Woodard is a 24 y.o. male who presents for Annual Exam (Pt is not fasting. Pt is moving to [place] this weekend.) and Hernia (Started a week ago. Saw his belly button protruding. ).    HPI  Last well exam: several years ago  Overall health has been good.  There have been no changes in the patients PMH, PSH, FH or social history. Reviewed today.  Diet: Overall pretty healthy  Exercise: weight training and cardio everyday- [facility].  His apartment building in [place] has a gym.  Moving into an apartment with 2 of his friends.  Working for an [employer] firm.  Dental: Regular dental exams. Brushes 2 times daily. Flosses 1 time daily.  Vision: Last Eye exam- no recent eye vision    Uncorrected Vision  Tobacco Use: None  Alcohol Use: 4 drinks a week   Sexually active: not currently in a relationship but sexually active.   Birth control: Condoms  Immunizations: needs updated tdap  Colonoscopy: No family history of colon cancer  Prostate Cancer Screening:No family history of prostate cancer.    Last labs: lipid panel ordered today    He reports he had some issues with abdominal pain. About a week ago noticed his belly button was tender to touch.   Reports he had some pain from the abdomen down to the right leg.   Reports it was uncomfortable to sit down for a couple of days.   Would have pain if woke up laying on his stomach.  No nausea or vomiting.   No change in bowel movement- no diarrhea or constipation.  Mo fever or chills.   No lump, bump or bulge in the abdomen. No testicle lumps, bumps or pain.   No groin pain.  Seems to be better now.    Review of Systems   Constitutional:  Negative for chills, fatigue, fever and unexpected weight change.   HENT:  Negative for congestion, ear pain, hearing loss, nosebleeds, postnasal drip, rhinorrhea, sinus pressure, sore throat and tinnitus.    Eyes:  Negative for photophobia, pain, discharge, redness, itching and visual disturbance.  "  Respiratory:  Negative for cough, chest tightness, shortness of breath and wheezing.    Cardiovascular:  Negative for chest pain, palpitations and leg swelling.   Gastrointestinal:  Positive for abdominal pain. Negative for blood in stool, constipation, diarrhea, nausea and vomiting.   Endocrine: Negative for cold intolerance, heat intolerance, polydipsia, polyphagia and polyuria.   Genitourinary:  Negative for difficulty urinating, dysuria, frequency, hematuria and urgency.   Musculoskeletal:  Negative for arthralgias, back pain, myalgias and neck pain.   Skin:  Negative for rash.   Neurological:  Negative for dizziness, syncope, speech difficulty, weakness, numbness and headaches.   Hematological:  Negative for adenopathy. Does not bruise/bleed easily.   Psychiatric/Behavioral:  Negative for dysphoric mood and sleep disturbance. The patient is not nervous/anxious.      Objective   BP 98/78 (BP Location: Left arm, Patient Position: Sitting)   Pulse 88   Temp 36.4 °C (97.6 °F) (Temporal)   Ht 1.787 m (5' 10.35\")   Wt 84.2 kg (185 lb 9.6 oz)   SpO2 97%   BMI 26.36 kg/m²     Physical Exam  Constitutional:       General: He is not in acute distress.     Appearance: Normal appearance. He is not toxic-appearing.   HENT:      Head: Normocephalic and atraumatic.      Right Ear: Tympanic membrane and ear canal normal.      Left Ear: Tympanic membrane and ear canal normal.      Nose: Nose normal.      Mouth/Throat:      Mouth: Mucous membranes are moist.      Pharynx: Oropharynx is clear.     Eyes:      Extraocular Movements: Extraocular movements intact.      Conjunctiva/sclera: Conjunctivae normal.      Pupils: Pupils are equal, round, and reactive to light.     Neck:      Thyroid: No thyroid mass or thyromegaly.     Cardiovascular:      Rate and Rhythm: Normal rate and regular rhythm.      Pulses: Normal pulses.      Heart sounds: Normal heart sounds, S1 normal and S2 normal. No murmur heard.  Pulmonary:      " Effort: Pulmonary effort is normal. No respiratory distress.      Breath sounds: Normal breath sounds.   Abdominal:      General: Abdomen is flat. Bowel sounds are normal.      Palpations: Abdomen is soft.      Tenderness: There is no abdominal tenderness. There is no guarding or rebound.      Hernia: No hernia is present.     Musculoskeletal:         General: Normal range of motion.      Cervical back: Normal range of motion and neck supple.      Right lower leg: No edema.      Left lower leg: No edema.   Lymphadenopathy:      Cervical: No cervical adenopathy.     Skin:     General: Skin is warm and dry.     Neurological:      Mental Status: He is alert and oriented to person, place, and time.      Cranial Nerves: No cranial nerve deficit.      Sensory: No sensory deficit.      Motor: No weakness.      Coordination: Coordination normal.      Gait: Gait normal.      Deep Tendon Reflexes: Reflexes are normal and symmetric. Reflexes normal.     Psychiatric:         Attention and Perception: Attention normal.         Mood and Affect: Mood and affect normal.         Speech: Speech normal.         Behavior: Behavior normal.         Thought Content: Thought content normal.         Judgment: Judgment normal.         Assessment/Plan   Problem List Items Addressed This Visit       Health care maintenance - Primary    Umbilical pain    No evidence of hernia or infection on exam today. Continue to monitor.           Other Visit Diagnoses         Encounter to vaccinate patient        Relevant Orders    Tdap vaccine, age 7 years and older  (BOOSTRIX) (Completed)      Screening for lipid disorders        Relevant Orders    Lipid Panel          Advised to continue to focus on a healthy diet and exercise   Check fasting lipid panel.   tdap given today.    It has been a pleasure seeing you today!

## 2025-08-06 LAB
CHOLEST SERPL-MCNC: 138 MG/DL
CHOLEST/HDLC SERPL: 2.6 (CALC)
HDLC SERPL-MCNC: 54 MG/DL
LDLC SERPL CALC-MCNC: 70 MG/DL (CALC)
NONHDLC SERPL-MCNC: 84 MG/DL (CALC)
TRIGL SERPL-MCNC: 64 MG/DL

## 2025-08-13 ENCOUNTER — TELEPHONE (OUTPATIENT)
Dept: PRIMARY CARE | Facility: CLINIC | Age: 25
End: 2025-08-13
Payer: COMMERCIAL